# Patient Record
Sex: FEMALE | Race: WHITE | NOT HISPANIC OR LATINO | Employment: FULL TIME | URBAN - METROPOLITAN AREA
[De-identification: names, ages, dates, MRNs, and addresses within clinical notes are randomized per-mention and may not be internally consistent; named-entity substitution may affect disease eponyms.]

---

## 2017-04-22 ENCOUNTER — HOSPITAL ENCOUNTER (EMERGENCY)
Facility: HOSPITAL | Age: 52
Discharge: HOME | End: 2017-04-22
Attending: EMERGENCY MEDICINE | Admitting: EMERGENCY MEDICINE

## 2017-04-22 ENCOUNTER — APPOINTMENT (OUTPATIENT)
Dept: CARDIOLOGY | Facility: HOSPITAL | Age: 52
End: 2017-04-22

## 2017-04-22 VITALS
TEMPERATURE: 98.5 F | HEIGHT: 65 IN | DIASTOLIC BLOOD PRESSURE: 79 MMHG | BODY MASS INDEX: 22.33 KG/M2 | SYSTOLIC BLOOD PRESSURE: 140 MMHG | HEART RATE: 58 BPM | WEIGHT: 134 LBS | OXYGEN SATURATION: 99 % | RESPIRATION RATE: 16 BRPM

## 2017-04-22 DIAGNOSIS — R60.0 PEDAL EDEMA: Primary | ICD-10-CM

## 2017-04-22 PROCEDURE — 99283 EMERGENCY DEPT VISIT LOW MDM: CPT

## 2017-04-22 PROCEDURE — 93971 EXTREMITY STUDY: CPT

## 2017-04-22 PROCEDURE — 93971 EXTREMITY STUDY: CPT | Performed by: INTERNAL MEDICINE

## 2017-04-22 RX ORDER — TAMOXIFEN CITRATE 20 MG/1
20 TABLET ORAL
COMMUNITY
Start: 2017-03-31 | End: 2017-06-30

## 2017-04-22 NOTE — ED PROVIDER NOTES
Subjective   HPI Comments: Patient was seen at urgent treatment center sent here to rule out DVT.    Patient is a 51 y.o. female presenting with lower extremity pain.   History provided by:  Patient   used: No    Lower Extremity Issue   Location:  Leg  Time since incident:  2 days  Injury: no    Leg location:  L leg and L lower leg  Pain details:     Quality:  Dull    Radiates to:  Does not radiate    Severity:  Mild    Onset quality:  Gradual    Duration:  3 days    Timing:  Intermittent    Progression:  Waxing and waning  Chronicity:  New  Dislocation: no    Foreign body present:  No foreign bodies  Relieved by:  Nothing  Worsened by:  Exercise  Ineffective treatments:  None tried  Associated symptoms: no back pain, no decreased ROM, no fever and no neck pain        Review of Systems   Constitutional: Negative for chills and fever.   Respiratory: Negative for chest tightness, shortness of breath and wheezing.    Cardiovascular: Negative for chest pain, palpitations and leg swelling.   Gastrointestinal: Negative for abdominal pain, nausea and vomiting.   Genitourinary: Negative for dysuria, frequency, hematuria and pelvic pain.   Musculoskeletal: Negative for back pain and neck pain.   Neurological: Negative for dizziness and weakness.   Psychiatric/Behavioral: Negative.    All other systems reviewed and are negative.      Past Medical History:   Diagnosis Date   • Breast cancer        No Known Allergies    Past Surgical History:   Procedure Laterality Date   • MASTECTOMY Bilateral        History reviewed. No pertinent family history.    Social History     Social History   • Marital status:      Spouse name: N/A   • Number of children: N/A   • Years of education: N/A     Social History Main Topics   • Smoking status: Never Smoker   • Smokeless tobacco: None   • Alcohol use No   • Drug use: No   • Sexual activity: Not Asked     Other Topics Concern   • None     Social History Narrative    • None           Objective   Physical Exam   Constitutional: She is oriented to person, place, and time. She appears well-developed and well-nourished.   HENT:   Head: Normocephalic and atraumatic.   Right Ear: External ear normal.   Left Ear: External ear normal.   Nose: Nose normal.   Mouth/Throat: Oropharynx is clear and moist.   Eyes: Conjunctivae and EOM are normal. Pupils are equal, round, and reactive to light. No scleral icterus.   Neck: Normal range of motion. No thyromegaly present.   Cardiovascular: Normal rate, regular rhythm and normal heart sounds.    Pulmonary/Chest: Effort normal and breath sounds normal. No respiratory distress. She has no wheezes. She has no rales. She exhibits no tenderness.   Abdominal: Soft. Bowel sounds are normal. She exhibits no distension. There is no tenderness.   Musculoskeletal: Normal range of motion.   Mild edema lower extremity.  Mild discomfort on the left malleolus.  Otherwise unremarkable   Lymphadenopathy:     She has no cervical adenopathy.   Neurological: She is alert and oriented to person, place, and time. She has normal reflexes. She displays normal reflexes. No cranial nerve deficit. Coordination normal.   Skin: Skin is warm and dry.   Psychiatric: She has a normal mood and affect. Her behavior is normal. Judgment and thought content normal.   Nursing note and vitals reviewed.      Procedures         ED Course  ED Course        No results found for this or any previous visit (from the past 24 hour(s)).  Note: In addition to lab results from this visit, the labs listed above may include labs taken at another facility or during a different encounter within the last 24 hours. Please correlate lab times with ED admission and discharge times for further clarification of the services performed during this visit.    No orders to display     Vitals:    04/22/17 1607 04/22/17 1633 04/22/17 1634 04/22/17 1933   BP: 153/74   140/79   Pulse: 82   58   Resp: 16   16  "  Temp:   98.5 °F (36.9 °C)    SpO2: 98%   99%   Weight:  134 lb (60.8 kg)     Height:  65\" (165.1 cm)       Medications - No data to display  ECG/EMG Results (last 24 hours)     ** No results found for the last 24 hours. **        No results found for this or any previous visit (from the past 24 hour(s)).  Note: In addition to lab results from this visit, the labs listed above may include labs taken at another facility or during a different encounter within the last 24 hours. Please correlate lab times with ED admission and discharge times for further clarification of the services performed during this visit.    No orders to display     Vitals:    04/22/17 1607 04/22/17 1633 04/22/17 1634 04/22/17 1933   BP: 153/74   140/79   Pulse: 82   58   Resp: 16   16   Temp:   98.5 °F (36.9 °C)    SpO2: 98%   99%   Weight:  134 lb (60.8 kg)     Height:  65\" (165.1 cm)       Medications - No data to display  ECG/EMG Results (last 24 hours)     ** No results found for the last 24 hours. **                  MDM  Number of Diagnoses or Management Options  Pedal edema: new and requires workup  Diagnosis management comments: Duplex Doppler was negative. Discussed need to follow up with PMD for further work up.        Amount and/or Complexity of Data Reviewed  Tests in the radiology section of CPT®: reviewed and ordered  Discuss the patient with other providers: yes    Patient Progress  Patient progress: stable      Final diagnoses:   Pedal edema            ANDRADE Green  04/26/17 2238       ANDRADE Green  04/29/17 0737       ANDRADE Green  04/29/17 0737       ANDRADE Green  04/30/17 1816    "

## 2017-04-25 LAB
BH CV LOWER VASCULAR LEFT COMMON FEMORAL AUGMENT: NORMAL
BH CV LOWER VASCULAR LEFT COMMON FEMORAL COMPRESS: NORMAL
BH CV LOWER VASCULAR LEFT COMMON FEMORAL PHASIC: NORMAL
BH CV LOWER VASCULAR LEFT COMMON FEMORAL SPONT: NORMAL
BH CV LOWER VASCULAR LEFT DISTAL FEMORAL COMPRESS: NORMAL
BH CV LOWER VASCULAR LEFT GASTRONEMIUS COMPRESS: NORMAL
BH CV LOWER VASCULAR LEFT GREATER SAPH AK COMPRESS: NORMAL
BH CV LOWER VASCULAR LEFT MID FEMORAL AUGMENT: NORMAL
BH CV LOWER VASCULAR LEFT MID FEMORAL COMPRESS: NORMAL
BH CV LOWER VASCULAR LEFT MID FEMORAL PHASIC: NORMAL
BH CV LOWER VASCULAR LEFT MID FEMORAL SPONT: NORMAL
BH CV LOWER VASCULAR LEFT PERONEAL COMPRESS: NORMAL
BH CV LOWER VASCULAR LEFT POPLITEAL AUGMENT: NORMAL
BH CV LOWER VASCULAR LEFT POPLITEAL COMPRESS: NORMAL
BH CV LOWER VASCULAR LEFT POPLITEAL PHASIC: NORMAL
BH CV LOWER VASCULAR LEFT POPLITEAL SPONT: NORMAL
BH CV LOWER VASCULAR LEFT POSTERIOR TIBIAL COMPRESS: NORMAL
BH CV LOWER VASCULAR LEFT PROXIMAL FEMORAL COMPRESS: NORMAL
BH CV LOWER VASCULAR LEFT SAPHENOFEMORAL JUNCTION AUGMENT: NORMAL
BH CV LOWER VASCULAR LEFT SAPHENOFEMORAL JUNCTION COMPRESS: NORMAL
BH CV LOWER VASCULAR LEFT SAPHENOFEMORAL JUNCTION PHASIC: NORMAL
BH CV LOWER VASCULAR LEFT SAPHENOFEMORAL JUNCTION SPONT: NORMAL
BH CV LOWER VASCULAR RIGHT COMMON FEMORAL AUGMENT: NORMAL
BH CV LOWER VASCULAR RIGHT COMMON FEMORAL COMPRESS: NORMAL
BH CV LOWER VASCULAR RIGHT COMMON FEMORAL PHASIC: NORMAL
BH CV LOWER VASCULAR RIGHT COMMON FEMORAL SPONT: NORMAL

## 2018-07-23 ENCOUNTER — TRANSCRIBE ORDERS (OUTPATIENT)
Dept: ADMINISTRATIVE | Facility: HOSPITAL | Age: 53
End: 2018-07-23

## 2018-07-23 DIAGNOSIS — M79.622 AXILLARY PAIN, LEFT: Primary | ICD-10-CM

## 2018-07-26 ENCOUNTER — HOSPITAL ENCOUNTER (OUTPATIENT)
Dept: MAMMOGRAPHY | Facility: HOSPITAL | Age: 53
Discharge: HOME | End: 2018-07-26

## 2018-07-26 ENCOUNTER — HOSPITAL ENCOUNTER (OUTPATIENT)
Dept: ULTRASOUND IMAGING | Facility: HOSPITAL | Age: 53
Discharge: HOME | End: 2018-07-26
Attending: OBSTETRICS & GYNECOLOGY | Admitting: OBSTETRICS & GYNECOLOGY

## 2018-07-26 DIAGNOSIS — M79.622 AXILLARY PAIN, LEFT: ICD-10-CM

## 2018-07-26 PROCEDURE — 77066 DX MAMMO INCL CAD BI: CPT

## 2018-07-26 PROCEDURE — 76642 ULTRASOUND BREAST LIMITED: CPT

## 2018-07-26 PROCEDURE — 77066 DX MAMMO INCL CAD BI: CPT | Performed by: RADIOLOGY

## 2018-07-26 PROCEDURE — G0279 TOMOSYNTHESIS, MAMMO: HCPCS

## 2018-07-26 PROCEDURE — 77062 BREAST TOMOSYNTHESIS BI: CPT | Performed by: RADIOLOGY

## 2018-07-26 PROCEDURE — 76642 ULTRASOUND BREAST LIMITED: CPT | Performed by: RADIOLOGY

## 2018-10-11 ENCOUNTER — HOSPITAL ENCOUNTER (OUTPATIENT)
Dept: PHYSICAL THERAPY | Facility: HOSPITAL | Age: 53
Setting detail: THERAPIES SERIES
Discharge: HOME | End: 2018-10-11

## 2018-10-11 DIAGNOSIS — N64.4 MASTODYNIA OF LEFT BREAST: Primary | ICD-10-CM

## 2018-10-11 DIAGNOSIS — L90.5 SCAR CONDITIONS AND FIBROSIS OF SKIN: ICD-10-CM

## 2018-10-11 PROCEDURE — 97140 MANUAL THERAPY 1/> REGIONS: CPT | Performed by: PHYSICAL THERAPIST

## 2018-10-11 PROCEDURE — 97161 PT EVAL LOW COMPLEX 20 MIN: CPT | Performed by: PHYSICAL THERAPIST

## 2018-10-11 PROCEDURE — 97535 SELF CARE MNGMENT TRAINING: CPT | Performed by: PHYSICAL THERAPIST

## 2018-10-11 NOTE — THERAPY EVALUATION
Physical Therapy Orthopedic / Lymphedema Initial Evaluation  [facility]     Patient Name: Valery Porter  : 1965  MRN: [id number]  Today's Date: 10/11/2018      Visit Date: 10/11/2018    Visit Dx:    ICD-10-CM ICD-9-CM   1. Mastodynia of left breast N64.4 611.71   2. Scar conditions and fibrosis of skin L90.5 709.2       There is no problem list on file for this patient.       Past Medical History:   Diagnosis Date   • Breast cancer (CMS/HCC)         Past Surgical History:   Procedure Laterality Date   • AUGMENTATION MAMMAPLASTY     • BREAST BIOPSY     • MASTECTOMY Bilateral        Visit Dx:    ICD-10-CM ICD-9-CM   1. Mastodynia of left breast N64.4 611.71   2. Scar conditions and fibrosis of skin L90.5 709.2             Patient History     Row Name 10/11/18 1300          Chief Complaint Pain  -MW    Type of Pain Chest pain;Rib pain   Left adjacent to reconstruction   -MW    Date Current Problem(s) Began 18  -MW    Brief Description of Current Complaint Pt reports noticing intermittent pain left ribs, near the breast starting in  of this year. She reports the pain is intermittent and does not seem to have a pattern to what triggers it. She denies concerns with flexibility or strength; also denies numbness or tingling. No issues with coughing, sneezing or deep breath. Referred to PT for assistance as diagnosistic testing is negative for any cancer recurrence or cyst.   -MW    Previous treatment for THIS PROBLEM --   None  -MW    Patient/Caregiver Goals Relieve pain;Know what to do to help the symptoms  -MW    Patient's Rating of General Health Excellent  -MW    Hand Dominance right-handed  -MW    Occupation/sports/leisure activities Works full time as  for a school system. Plays piano, walks and cares for her 3 children (twins in 8th grade and sophomore).   -MW    Patient seeing anyone else for problem(s)? Seen by Dr Casillas and Dr Shruti Alan.   -MW    What clinical tests have  you had for this problem? Mammogram;Other 1 (comment)   US  -MW    Results of Clinical Tests Negative for dz processes or cysts   -MW    Are you or can you be pregnant No  -MW          Pain Location Chest;Breast;Rib cage   Left   -MW    Pain at Present 0  -MW    Pain at Best 0  -MW    Pain at Worst 4  -MW    Pain Frequency Intermittent;Several days a week  -MW    Pain Description Shooting;Stabbing;Tender  -MW    What Performance Factors Make the Current Problem(s) WORSE? unknown   -MW    What Performance Factors Make the Current Problem(s) BETTER? unknown  -MW    Pain Comments No pattern to pain   -MW    Is your sleep disturbed? No  -MW          Any falls in the past year: No  -MW          Primary Language English  -MW    Are you able to read Yes  -MW    Are you able to write Yes  -MW    How does patient learn best? Demonstration;Listening  -MW    Teaching needs identified Home Exercise Program;Management of Condition  -MW    Patient is concerned about/has problems with Other (comment)   pain   -MW    Does patient have problems with the following? None  -MW    Barriers to learning None  -MW    Pt Participated in POC and Goals Yes  -MW          Are you being hurt, hit, or frightened by anyone at home or in your life? No  -MW    Are you being neglected by a caregiver No  -MW      User Key  (r) = Recorded By, (t) = Taken By, (c) = Cosigned By    Initials Name Provider Type    Katie Tim, PT Physical Therapist                Lymphedema     Row Name 10/11/18 1300          Lymphedema Cancer Related Sx bilateral;modified radical mastectomy;reconstructive;left;sentinel node biopsy  -MW    Lymphedema Surgery Comments MD Nicole; 2015  -MW    Lymph Nodes Removed # 1   pt unsure if more than 1  -MW    Positive Lymph Nodes # 0  -MW    Chemo Received --   tamoxafin   -MW    Radiation Therapy Received no  -MW    Infections or Cellulitis? no  -MW    Lymphedema Assessment Comments stage 0 / minimal risk   -MW           Location/Assessment Upper Quadrant  -MW    Upper Quadrant Conditions bilateral:;intact;clean  -MW    Upper Quadrant Color/Pigment bilateral:;normal  -MW    Upper Quadrant Skin Details Bilat mastectomy incision lines, as well as reconstruction. Lt inferior lateral incision line puckered and restricted on Lt, smooth on Rt   -MW          Lymphedema Pulses/Capillary Refill capillary refill  -MW    Capillary Refill upper extremity capillary refill  -MW    Upper Extremity Capillary Refill left:;less than 3 seconds  -MW          Manual Lymphatic Drainage astym  -MW    Astym anterior-lateral chest;scar(s) / incision line(s)  -MW    Scar(s) / Incision Line(s) Lt inferior lateral incision line starburst with isolator; moderate course and fine soft tissue disruptions noted.   -MW    Anterior-Lateral Chest left  -MW    Anterior-Lateral Chest Comment In supine evaluator and localizer to lateral ribs and inferior ribs. Min to moderate course to fine soft tissue disruptions noted. Repositioned with HBH and open axilla; continued ASTYM into upper arm and superior lateral chest with evaluator and localizer. Reworking lateral ribs in this position. Mild course soft tissue disruptions noted in area of pain (but not tender during tx).   -MW    Manual Therapy STM: including tissue bending adjacent to incision line and lateral breast, into axilla   -MW      User Key  (r) = Recorded By, (t) = Taken By, (c) = Cosigned By    Initials Name Provider Type    Katie Tim PT Physical Therapist                  PT Ortho     Row Name 10/11/18 1300       Subjective Comments    Subjective Comments Doctors couldn't find anything on tests; thought PT might be helpful as I haven't ever had any since mastectomies and reconstructions.   -MW       Subjective Pain    Able to rate subjective pain? yes  -MW    Pre-Treatment Pain Level 0  -MW    Post-Treatment Pain Level 0  -MW       General ROM    GENERAL ROM COMMENTS Shoulder ROM WFL to WNL  bilaterally   -MW       MMT (Manual Muscle Testing)    General MMT Comments Pt denies concerns about strength   -MW      User Key  (r) = Recorded By, (t) = Taken By, (c) = Cosigned By    Initials Name Provider Type    Katie Tim PT Physical Therapist                    Therapy Education  Education Details: HEP: HBH chicken wings with rotation and sidebending with deep breath; ASTYM care and follow up.  Given: HEP, Symptoms/condition management, Pain management  Program: New  How Provided: Verbal, Demonstration, Written  Provided to: Patient  Level of Understanding: Teach back education performed, Verbalized, Demonstrated  26346 - PT Self Care/Mgmt Minutes: 8            Exercises     Row Name 10/11/18 1300             Subjective Comments    Subjective Comments Doctors couldn't find anything on tests; thought PT might be helpful as I haven't ever had any since mastectomies and reconstructions.   -MW         Subjective Pain    Able to rate subjective pain? yes  -MW      Pre-Treatment Pain Level 0  -MW      Post-Treatment Pain Level 0  -MW      Subjective Pain Comment Pain is intermittent; very mild tenderness to palpation   -MW         Total Minutes    70692 - PT Manual Therapy Minutes 20  -MW         Exercise 1    Exercise Name 1 HBH chicken wings with trunk rotation   -MW      Cueing 1 Demo;Verbal  -MW      Reps 1 4; 2 each way   -MW      Additional Comments VC for deep breath at end of rotation   -MW         Exercise 2    Exercise Name 2 HBH chicken wings with trunk sidebending   -MW      Cueing 2 Demo;Verbal  -MW      Reps 2 4; 2 each way   -MW      Additional Comments VC for deep breath at end of rotation   -MW        User Key  (r) = Recorded By, (t) = Taken By, (c) = Cosigned By    Initials Name Provider Type    Katie Tim PT Physical Therapist                              PT OP Goals     Row Name 10/11/18 1300          STG Date to Achieve 11/08/18  -MW    STG 1 Pt independent with HEP for  flexibility to decrease lateral ribs/ breast pain.   -MW    STG 2 Pt to report 25% decrease in frequency and / or intensity of Lt lateral ribs/ breast pain.   -MW    STG 3 Lt lateral inferior incision line soft tissue to be at least 25% more mobile to promote decreased pain.   -MW          LTG 1 Pt to be independent with HEP and self massage to decrease pain and tightness Lt lateral ribs/ breast.   -MW    LTG 2 Pt to report > 50% decrease in frequency and / or intensity of Lt lateral ribs/ breast pain.   -MW    LTG 3 Lt lateral inferior incision line soft tissue to be greater than 50% more mobile to promote decreased pain.   -MW          PT Goal Re-Cert Due Date 01/11/19  -MW      User Key  (r) = Recorded By, (t) = Taken By, (c) = Cosigned By    Initials Name Provider Type    Katie Tim, PT Physical Therapist                PT Assessment/Plan     Row Name 10/11/18 1300          Functional Limitations Other (comment)   Intermittent pain; soft tissue restrictions   -MW    Impairments Pain;Impaired flexibility  -MW    Assessment Comments Pt presents with c/o pain left lateral trunk / ribs adjacent to breast reconstruction area. Unknown trigger to onset of pain, and difficult to reproduce. Mild tenderness over mid lateral ribs, but noted moderate soft tissue restrictions at Lt inferior-lateral incision line including tissue puckering which is absent on the pain-free Rt side. Expect ASTYM and STM to assist in decreasing the soft tissue restrictions and disruptions as well as decrease her pain symptoms. Pt demonstrates adequate ROM and denies weakness or numbness/ tingling.   -MW    Please refer to paper survey for additional self-reported information Yes  -MW    Rehab Potential Good  -MW    Patient/caregiver participated in establishment of treatment plan and goals Yes  -MW    Patient would benefit from skilled therapy intervention Yes  -MW          PT Frequency Other (comment)   2 x month   -MW    Predicted  Duration of Therapy Intervention (Therapy Eval) 6 visits  -MW    Planned CPT's? PT EVAL LOW COMPLEXITY: 97958;PT MANUAL THERAPY EA 15 MIN: 84241;PT THER PROC EA 15 MIN: 63381  -MW    Physical Therapy Interventions (Optional Details) manual therapy techniques;patient/family education;home exercise program;stretching;strengthening;ROM (Range of Motion)  -MW    PT Plan Comments Cont ASTYM / STM to Lt upper quarter; progress HEP; monitor pain frequency and intensity   -MW      User Key  (r) = Recorded By, (t) = Taken By, (c) = Cosigned By    Initials Name Provider Type    MW Katie Nielsen, PT Physical Therapist             Outcome Measure Options: Disabilities of the Arm, Shoulder, and Hand (DASH)  DASH  Open a tight or new jar.: No Difficulty  Write: No Difficulty  Turn a key: No Difficulty  Prepare a meal: No Difficulty  Push open a heavy door: No Difficulty  Place an object on a shelf above your head: No Difficulty  Do heavy household chores (e.g., wash walls, wash floors): No Difficulty  Garden or do yard work: No Difficulty  Make a bed: No Difficulty  Carry a shopping bag or briefcase: No Difficulty  Carry a heavy object (over 10 lbs): No Difficulty  Change a lightbulb overhead: No Difficulty  Wash or blow dry your hair: No Difficulty  Wash your back: No Difficulty  Put on a pullover sweater: No Difficulty  Use a knife to cut food: No Difficulty  Recreational activities in which require little effort (e.g., cardplaying, knitting, etc.): No Difficulty  Recreational activities in which you take some force or impact through your arm, should or hand (e.g. golf, hammering, tennis, etc.): No Difficulty  Recreational Activities in which you move your arm freely (e.g., frisbee, badminton, etc.): No Difficulty  Manage transportation needs (getting from one place to another): No Difficulty  Sexual Activities: No Difficulty  During the past week, to what extent has your arm, shoulder, or hand problem interfered with your  normal social activites with family, friends, neighbors or groups?: Not at all  During the past week, were you limited in your work or other regular daily activities as a result of your arm, shoulder or hand problem?: Not limited at all  Arm, Shoulder, or hand pain: None  Arm, shoulder or hand pain when you performed any specific activity: None  Tingling (pins and needles) in your arm, shoulder, or hand: None  Weakness in your arm, shoulder or hand: None  Stiffness in your arm, shoulder or hand: None  During the past week, how much difficulty have you had sleeping because of the pain in your arm, shoulder or hand?: No difficulty  I feel less capable, less confident or less useful because of my arm, shoulder or hand problem: Strongly disagree  DASH Sum : 30  Number of Questions Answered: 30  DASH Score: 0  Work Module (Optional)  Using your usual technique for your work?: No Difficulty  Doing your usual work because of arm, shoulder or hand pain?: No Difficulty  Doing your work as well as you would like?: No Difficulty  Spending your usual amount of time doing your work?: No Difficulty  Work Module Score: 0  Sports/Performing Arts Module (Optional)  Using your usual technique for playing your instrument or sport?: No Difficulty  Playing your musical instrument or sport because of arm, shoulder or hand pain?: No Difficulty  Playing your musical instrument or sport as well as you would like?: No Difficulty  Spending your usual amount of time practising or playing your instrument or sport?: No Difficulty  Sports/Performing Arts Score: 0         Time Calculation:   Start Time: 1300  Total Timed Code Minutes- PT: 28 minute(s)   Therapy Suggested Charges     Code   Minutes Charges    10215 (CPT®) Hc Pt Neuromusc Re Education Ea 15 Min      20473 (CPT®) Hc Pt Ther Proc Ea 15 Min      25192 (CPT®) Hc Gait Training Ea 15 Min      22427 (CPT®) Hc Pt Therapeutic Act Ea 15 Min      79998 (CPT®) Hc Pt Manual Therapy Ea 15 Min 20  1    02468 (CPT®) Hc Pt Ther Massage- Per 15 Min      17812 (CPT®) Hc Pt Iontophoresis Ea 15 Min      71057 (CPT®) Hc Pt Elec Stim Ea-Per 15 Min      36768 (CPT®) Hc Pt Ultrasound Ea 15 Min      24151 (CPT®) Hc Pt Self Care/Mgmt/Train Ea 15 Min 8 1    11945 (CPT®) Hc Pt Prosthetic (S) Train Initial Encounter, Each 15 Min      18316 (CPT®) Hc Orthotic(S) Mgmt/Train Initial Encounter, Each 15min      89527 (CPT®) Hc Pt Aquatic Therapy Ea 15 Min      62729 (CPT®) Hc Pt Orthotic(S)/Prosthetic(S) Encounter, Each 15 Min       (CPT®) Hc Pt Electrical Stim Unattended      Total  28 2        Therapy Charges for Today     Code Description Service Date Service Provider Modifiers Qty    51252614588 HC PT MANUAL THERAPY EA 15 MIN 10/11/2018 Katie Nielsen, PT GP 1    00994649261 HC PT SELF CARE/MGMT/TRAIN EA 15 MIN 10/11/2018 Katie Nielsen, PT GP 1    11434265647 HC PT EVAL LOW COMPLEXITY 3 10/11/2018 Katie Nielsen, PT GP 1          PT G-Codes  Outcome Measure Options: Disabilities of the Arm, Shoulder, and Hand (DASH)         Katie Nielsen, PT  10/11/2018

## 2018-10-12 ENCOUNTER — TRANSCRIBE ORDERS (OUTPATIENT)
Dept: PHYSICAL THERAPY | Facility: HOSPITAL | Age: 53
End: 2018-10-12

## 2018-10-12 DIAGNOSIS — N64.4 BREAST PAIN: Primary | ICD-10-CM

## 2018-11-15 ENCOUNTER — HOSPITAL ENCOUNTER (OUTPATIENT)
Dept: PHYSICAL THERAPY | Facility: HOSPITAL | Age: 53
Setting detail: THERAPIES SERIES
Discharge: HOME | End: 2018-11-15
Attending: SURGERY

## 2018-11-15 DIAGNOSIS — N64.4 MASTODYNIA OF LEFT BREAST: Primary | ICD-10-CM

## 2018-11-15 DIAGNOSIS — L90.5 SCAR CONDITIONS AND FIBROSIS OF SKIN: ICD-10-CM

## 2018-11-15 PROCEDURE — 97140 MANUAL THERAPY 1/> REGIONS: CPT | Performed by: PHYSICAL THERAPIST

## 2018-11-15 PROCEDURE — 97110 THERAPEUTIC EXERCISES: CPT | Performed by: PHYSICAL THERAPIST

## 2018-11-15 NOTE — THERAPY PROGRESS REPORT/RE-CERT
Outpatient Physical Therapy Ortho/ Lymphedema Progress Note  [facility]     Patient Name: Valery Porter  : 1965  MRN: [id number]  Today's Date: 11/15/2018        Visit Date: 11/15/2018    Visit Dx:    ICD-10-CM ICD-9-CM   1. Mastodynia of left breast N64.4 611.71   2. Scar conditions and fibrosis of skin L90.5 709.2       There is no problem list on file for this patient.       Lymphedema     Row Name 11/15/18 1300          Able to rate subjective pain?  yes  -MW    Pre-Treatment Pain Level  0  -MW    Post-Treatment Pain Level  0  -MW          Subjective Comments  Intermittent pain less frequent; doing exercises most days.   -MW          Location/Assessment  Upper Quadrant  -MW    Upper Quadrant Conditions  bilateral:;intact;clean  -MW    Upper Quadrant Color/Pigment  bilateral:;normal  -MW          Manual Lymphatic Drainage  astym  -MW    Astym  anterior-lateral chest;scar(s) / incision line(s);other astym  -MW    Scar(s) / Incision Line(s)  Lt inferior lateral incision line starburst with isolator; moderate course and fine soft tissue disruptions noted.   -MW    Anterior-Lateral Chest  left  -MW    Other Astym  ASTYM initiated in sitting with upper body resting on table top face craddle. Evaluator and localizer to Lt lateral trunk; isolator to lower ribs / adjacent to incision line. In supine evaluator and localizer to lateral and inferior ribs. Min to moderate course to fine soft tissue disruptions noted. Reworking lateral ribs with UE over head. Mild to moderate fine to course disruptions noted in lateral trunk / over area of pain but only mildly tender to palpation.   -MW      User Key  (r) = Recorded By, (t) = Taken By, (c) = Cosigned By    Initials Name Provider Type    Katie Tim, PT Physical Therapist                        PT Assessment/Plan     Row Name 11/15/18 1300          PT Assessment    Functional Limitations  Other (comment) Intermittent pain; soft tissue restrictions   -MW      Impairments  Pain;Impaired flexibility  -MW     Assessment Comments  Pt returns with decreased complaints of pain; less frequent and less intense. Soft tissue disruptions persist with roughness noted during ASTYM, as well as soft tissue mobility restrictions noted during STM. Progressed HEP to continue to focus on loosening soft tissues along Lt lateral trunk / ribs. Cont PT 2 x month; follow up in approx 2 weeks for 1-2 more sessions of manual therapy.   -MW     Rehab Potential  Good  -MW     Patient/caregiver participated in establishment of treatment plan and goals  Yes  -MW     Patient would benefit from skilled therapy intervention  Yes  -MW        PT Plan    PT Frequency  Other (comment) 2 x month   -MW     Predicted Duration of Therapy Intervention (Therapy Eval)  4 visits   -MW     Planned CPT's?  PT MANUAL THERAPY EA 15 MIN: 38034;PT THER PROC EA 15 MIN: 73687  -MW     Physical Therapy Interventions (Optional Details)  manual therapy techniques;stretching;ROM (Range of Motion);patient/family education;home exercise program  -MW     PT Plan Comments  Cont ASTYM / STM to Lt upper quarter; progress HEP; monitor pain frequency and intensity   -MW       User Key  (r) = Recorded By, (t) = Taken By, (c) = Cosigned By    Initials Name Provider Type    Katie Tim, PT Physical Therapist               Exercises     Row Name 11/15/18 1300             Subjective Comments    Subjective Comments  Intermittent pain less frequent; doing exercises most days.   -MW         Subjective Pain    Able to rate subjective pain?  yes  -MW      Pre-Treatment Pain Level  0  -MW      Post-Treatment Pain Level  0  -MW         Total Minutes    35266 - PT Therapeutic Exercise Minutes  8  -MW      20309 - PT Manual Therapy Minutes  20  -MW         Exercise 1    Exercise Name 1  RT SL with upper trunk rotation to LT   -MW      Cueing 1  Verbal;Tactile  -MW      Reps 1  2  -MW      Additional Comments  VC for deep breath at end  "of rotation   -MW         Exercise 2    Exercise Name 2  Supine side bending; \"banana stretch\"   -MW      Cueing 2  Demo;Verbal  -MW      Reps 2  2  -MW      Additional Comments  VC for deep breath at end of stretch   -MW        User Key  (r) = Recorded By, (t) = Taken By, (c) = Cosigned By    Initials Name Provider Type    MW Katie Nielsen, PT Physical Therapist                       Manual Rx (last 36 hours)      Manual Treatments     Row Name 11/15/18 1300             Total Minutes    43651 - PT Manual Therapy Minutes  20  -MW         Manual Rx 1    Manual Rx 1 Location  Lt lateral trunk/ ribs/ inferior and lateral to breast reconstruction   -MW      Manual Rx 1 Type  STM: tissue lifting, bending and space correction techniques; fascial stretches   -MW      Manual Rx 1 Duration  10  -MW        User Key  (r) = Recorded By, (t) = Taken By, (c) = Cosigned By    Initials Name Provider Type    MW Katie Nielsen, PT Physical Therapist          PT OP Goals     Row Name 11/15/18 1300          STG 1  Pt independent with HEP for flexibility to decrease lateral ribs/ breast pain.   -MW    STG 1 Progress  Met  -MW    STG 2  Pt to report 25% decrease in frequency and / or intensity of Lt lateral ribs/ breast pain.   -MW    STG 2 Progress  Met  -MW    STG 3  Lt lateral inferior incision line soft tissue to be at least 25% more mobile to promote decreased pain.   -MW    STG 3 Progress  Partially Met;Ongoing  -MW          LTG 1  Pt to be independent with HEP and self massage to decrease pain and tightness Lt lateral ribs/ breast.   -MW    LTG 1 Progress  Ongoing  -MW    LTG 2  Pt to report > 50% decrease in frequency and / or intensity of Lt lateral ribs/ breast pain.   -MW    LTG 2 Progress  Ongoing  -MW    LTG 3  Lt lateral inferior incision line soft tissue to be greater than 50% more mobile to promote decreased pain.   -MW    LTG 3 Progress  Ongoing  -MW          PT Goal Re-Cert Due Date  01/11/19  -MW      User Key  " (r) = Recorded By, (t) = Taken By, (c) = Cosigned By    Initials Name Provider Type    MW Katie Nielsen, PT Physical Therapist          Therapy Education  Education Details: HEP added banana stretch, RT SL with upper trunk rotation Lt.   Given: HEP, Symptoms/condition management, Pain management  Program: Progressed  How Provided: Verbal, Demonstration, Written  Provided to: Patient  Level of Understanding: Teach back education performed, Verbalized, Demonstrated              Time Calculation:   Start Time: 1300  Total Timed Code Minutes- PT: 28 minute(s)   Therapy Suggested Charges     Code   Minutes Charges    98640 (CPT®) Hc Pt Neuromusc Re Education Ea 15 Min      81718 (CPT®) Hc Pt Ther Proc Ea 15 Min 8 1    81886 (CPT®) Hc Gait Training Ea 15 Min      27337 (CPT®) Hc Pt Therapeutic Act Ea 15 Min      37987 (CPT®) Hc Pt Manual Therapy Ea 15 Min 20 1    19465 (CPT®) Hc Pt Ther Massage- Per 15 Min      38341 (CPT®) Hc Pt Iontophoresis Ea 15 Min      40678 (CPT®) Hc Pt Elec Stim Ea-Per 15 Min      23835 (CPT®) Hc Pt Ultrasound Ea 15 Min      13043 (CPT®) Hc Pt Self Care/Mgmt/Train Ea 15 Min      03611 (CPT®) Hc Pt Prosthetic (S) Train Initial Encounter, Each 15 Min      33093 (CPT®) Hc Orthotic(S) Mgmt/Train Initial Encounter, Each 15min      52111 (CPT®) Hc Pt Aquatic Therapy Ea 15 Min      05396 (CPT®) Hc Pt Orthotic(S)/Prosthetic(S) Encounter, Each 15 Min       (CPT®) Hc Pt Electrical Stim Unattended      Total  28 2        Therapy Charges for Today     Code Description Service Date Service Provider Modifiers Qty    49832056606 HC PT THER PROC EA 15 MIN 11/15/2018 Katie Nielsen, PT GP 1    82732049626 HC PT MANUAL THERAPY EA 15 MIN 11/15/2018 Katie Nielsen, PT GP 1                    Katie Nielsen, PT  11/15/2018

## 2019-01-24 ENCOUNTER — DOCUMENTATION (OUTPATIENT)
Dept: PHYSICAL THERAPY | Facility: HOSPITAL | Age: 54
End: 2019-01-24

## 2019-01-24 DIAGNOSIS — L90.5 SCAR CONDITIONS AND FIBROSIS OF SKIN: ICD-10-CM

## 2019-01-24 DIAGNOSIS — N64.4 MASTODYNIA OF LEFT BREAST: Primary | ICD-10-CM

## 2019-01-24 NOTE — THERAPY DISCHARGE NOTE
Outpatient Physical Therapy Lymphedema Discharge Summary       Patient Name: Valery Porter  : 1965  MRN: [id number]  Today's Date: 2019      Visit Date: 2019    Visit Dx:    ICD-10-CM ICD-9-CM   1. Mastodynia of left breast N64.4 611.71   2. Scar conditions and fibrosis of skin L90.5 709.2           PT OP Goals     Row Name 19 1430          STG 1  Pt independent with HEP for flexibility to decrease lateral ribs/ breast pain.   -MW    STG 1 Progress  Met  -MW    STG 2  Pt to report 25% decrease in frequency and / or intensity of Lt lateral ribs/ breast pain.   -MW    STG 2 Progress  Met  -MW    STG 3  Lt lateral inferior incision line soft tissue to be at least 25% more mobile to promote decreased pain.   -MW    STG 3 Progress  Partially Met;Ongoing  -MW          LTG 1  Pt to be independent with HEP and self massage to decrease pain and tightness Lt lateral ribs/ breast.   -MW    LTG 1 Progress  Partially Met  -MW    LTG 2  Pt to report > 50% decrease in frequency and / or intensity of Lt lateral ribs/ breast pain.   -MW    LTG 2 Progress  Partially Met  -MW    LTG 3  Lt lateral inferior incision line soft tissue to be greater than 50% more mobile to promote decreased pain.   -MW    LTG 3 Progress  Partially Met  -MW      User Key  (r) = Recorded By, (t) = Taken By, (c) = Cosigned By    Initials Name Provider Type    Katie Tim, PT Physical Therapist                        OP PT Discharge Summary  Date of Discharge: 19  Reason for Discharge: other (comment)(Difficult to schedule due to travel distance & work schedule )  Outcomes Achieved: Patient able to partially acheive established goals  Discharge Destination: Home with home program  Discharge Instructions/Additional Comments: Due to distance and pt's schedule, she feels she would need to take a day off from work to come to PT; pain has improved at this time. Pt agreeable to discharge at this time as has not been seen  since Nov 15, 2018 and unsure of next appt option. She will contact MD for new referral if needed in the future to be scheduled again.       Katie Nielsen, PT  1/24/2019

## 2019-09-05 ENCOUNTER — LAB REQUISITION (OUTPATIENT)
Dept: LAB | Facility: HOSPITAL | Age: 54
End: 2019-09-05

## 2019-09-05 DIAGNOSIS — N95.0 POSTMENOPAUSAL BLEEDING: ICD-10-CM

## 2019-09-05 PROCEDURE — 88305 TISSUE EXAM BY PATHOLOGIST: CPT | Performed by: OBSTETRICS & GYNECOLOGY

## 2019-09-06 LAB
CYTO UR: NORMAL
LAB AP CASE REPORT: NORMAL
LAB AP CLINICAL INFORMATION: NORMAL
PATH REPORT.FINAL DX SPEC: NORMAL
PATH REPORT.GROSS SPEC: NORMAL

## 2021-08-18 ENCOUNTER — OFFICE VISIT (OUTPATIENT)
Dept: OBSTETRICS AND GYNECOLOGY | Facility: CLINIC | Age: 56
End: 2021-08-18

## 2021-08-18 VITALS
BODY MASS INDEX: 22.82 KG/M2 | WEIGHT: 137 LBS | HEIGHT: 65 IN | DIASTOLIC BLOOD PRESSURE: 70 MMHG | SYSTOLIC BLOOD PRESSURE: 102 MMHG

## 2021-08-18 DIAGNOSIS — Z01.419 ENCOUNTER FOR ANNUAL ROUTINE GYNECOLOGICAL EXAMINATION: Primary | ICD-10-CM

## 2021-08-18 DIAGNOSIS — Z85.3 PERSONAL HISTORY OF BREAST CANCER: ICD-10-CM

## 2021-08-18 PROCEDURE — 99396 PREV VISIT EST AGE 40-64: CPT | Performed by: OBSTETRICS & GYNECOLOGY

## 2021-08-18 RX ORDER — ATORVASTATIN CALCIUM 40 MG/1
40 TABLET, FILM COATED ORAL DAILY
COMMUNITY
Start: 2021-08-10

## 2021-08-18 NOTE — PROGRESS NOTES
GYN Annual Exam     CC - Here for annual exam.     Subjective   HPI  Valery Porter is a 56 y.o. female, , who presents for annual well woman exam.  She is perimenopause . Her last LMP was No LMP recorded (lmp unknown). Patient is perimenopausal..  Periods are absent , lasting 0 days.  Dysmenorrhea:none.  Patient reports problems with: none.  Partner Status: Marital Status: .    Desires STD Screening: YES/NO/Other: no.  Patient has no questions or concerns today.    Additional OB/GYN History   Current contraception: none    Last Pap : 2020  Last Completed Pap Smear          Ordered - PAP SMEAR (Every 3 Years) Ordered on 2020  Done - neg              History of abnormal Pap smear: yes   Family history of uterine, colon, breast, or ovarian cancer: breast cancer   Last mammogram: 2020   Last Completed Mammogram          MAMMOGRAM (Every 2 Years) Next due on 2020  Done - normal    2018  Mammo Diagnostic Digital Tomosynthesis Bilateral With CAD              Last colonoscopy: per patient  scheduled for    Last Completed Colonoscopy     This patient has no relevant Health Maintenance data.        Last DEXA: patient is due she states she has to call back to schedule this   Exercises Regularly: yes when she can   Feelings of Anxiety or Depression: none  Tobacco Usage?: no    Health Maintenance   Topic Date Due   • Annual Gynecologic Pelvic and Breast Exam  Never done   • COLORECTAL CANCER SCREENING  Never done   • ANNUAL PHYSICAL  Never done   • HEPATITIS C SCREENING  Never done   • ZOSTER VACCINE (2 of 2) 2021   • INFLUENZA VACCINE  10/01/2021   • MAMMOGRAM  2022   • PAP SMEAR  2023   • TDAP/TD VACCINES (8 - Td or Tdap) 2028   • COVID-19 Vaccine  Completed   • Pneumococcal Vaccine 0-64  Aged Out       Current Outpatient Medications   Medication Sig Dispense Refill   • atorvastatin (LIPITOR) 40 MG tablet Take 40 mg  "by mouth Daily.       No current facility-administered medications for this visit.       The additional following portions of the patient's history were reviewed and updated as appropriate: allergies, current medications, past family history, past medical history, past social history, past surgical history and problem list.    Review of Systems   Constitutional: Negative.    HENT: Negative.    Eyes: Negative.    Respiratory: Negative.    Cardiovascular: Negative.    Gastrointestinal: Negative.    Endocrine: Negative.    Genitourinary: Negative.    Musculoskeletal: Negative.    Allergic/Immunologic: Negative.    Neurological: Negative.    Hematological: Negative.    Psychiatric/Behavioral: Negative.        I have reviewed and agree with the HPI, ROS, and historical information as entered above. Cristo Casillas MD    Objective   /70   Ht 165.1 cm (65\")   Wt 62.1 kg (137 lb)   LMP  (LMP Unknown)   Breastfeeding No   BMI 22.80 kg/m²     PE    Breast: Bilateral mastectomies and implants noted  Axilla: Normal, no lymphadenopathy  Heart: Regular rate no murmurs rubs or gallops  Lungs: Clear to auscultation, normal breath sounds bilaterally  Abdomen: Soft nontender, no hepatosplenomegaly, no guarding or rebound, no masses  Pelvic exam  External genitalia: Normal introitus and vulva  Vagina: Normal mucosa no bleeding inflammation or discharge  Bladder: Normal position nontender  Urethral meatus and urethra: Normal nontender  Cervix: No lesions, no discharge, bleeding or inflammation  Bimanual: Nontender adnexa clear, no sign of uterine or ovarian enlargement      Assessment/Plan     Assessment     Problem List Items Addressed This Visit        Genitourinary and Reproductive     Encounter for annual routine gynecological examination - Primary    Relevant Orders    IGP, Rfx Aptima HPV ASCU       Hematology and Neoplasia    Personal history of breast cancer          1. GYN annual well woman exam.   2. Continue " yearly Paps    Plan     1. Reviewed HPV guidelines and she would like to continue yearly paps regardless.   2. Recommended use of Vitamin D and getting adequate calcium in her diet. (1500mg)  3. Anticipatory menopausal guidance given.  Preventative health initiatives reviewed  Cristo Casillas MD  08/18/2021

## 2021-08-24 DIAGNOSIS — Z01.419 ENCOUNTER FOR ANNUAL ROUTINE GYNECOLOGICAL EXAMINATION: ICD-10-CM

## 2022-08-23 ENCOUNTER — OFFICE VISIT (OUTPATIENT)
Dept: OBSTETRICS AND GYNECOLOGY | Facility: CLINIC | Age: 57
End: 2022-08-23

## 2022-08-23 VITALS
SYSTOLIC BLOOD PRESSURE: 120 MMHG | BODY MASS INDEX: 22.13 KG/M2 | WEIGHT: 132.8 LBS | DIASTOLIC BLOOD PRESSURE: 72 MMHG | HEIGHT: 65 IN

## 2022-08-23 DIAGNOSIS — Z12.39 ENCOUNTER FOR BREAST CANCER SCREENING USING NON-MAMMOGRAM MODALITY: ICD-10-CM

## 2022-08-23 DIAGNOSIS — Z01.419 WOMEN'S ANNUAL ROUTINE GYNECOLOGICAL EXAMINATION: Primary | ICD-10-CM

## 2022-08-23 DIAGNOSIS — Z78.0 POSTMENOPAUSAL STATUS: ICD-10-CM

## 2022-08-23 DIAGNOSIS — Z85.3 HISTORY OF BREAST CANCER: ICD-10-CM

## 2022-08-23 PROCEDURE — 99396 PREV VISIT EST AGE 40-64: CPT | Performed by: OBSTETRICS & GYNECOLOGY

## 2022-08-23 NOTE — PROGRESS NOTES
Gynecologic Annual Exam Note          GYN Annual Exam     Gynecologic Exam (Annual )        Subjective     HPI  Valery Porter is a 57 y.o. female, , who presents for annual well woman exam as a established patient . No LMP recorded. Patient is perimenopausal. Patient reports problems with: none.  Partner Status: Marital Status: . She is is not currently sexually active. STD testing recommendations have been explained to the patient and she does not desire STD testing. There were no changes to her medical or surgical history since her last visit..       Additional OB/GYN History   Current contraception: contraceptive methods: None  Desires to: continue contraception    Last Pap : 2021. Result: negative. HPV: unknown   Last Completed Pap Smear          PAP SMEAR (Every 3 Years) Next due on 2022  LIQUID-BASED PAP SMEAR, [facility] ([facility])    2021  SCANNED - PAP SMEAR    2020  Done - neg              History of abnormal Pap smear: yes - years ago   Family history of uterine, colon, breast, or ovarian cancer: yes - Sister and MGM-Breast   Performs monthly Self-Breast Exam: yes  Last mammogram: 2020. Done at .    Last Completed Mammogram     This patient has no relevant Health Maintenance data.          History of abnormal mammogram: yes -     Colonoscopy: has had a colonoscopy 1 year(s) ago.  Exercises Regularly: yes  Feelings of Anxiety or Depression: no  Tobacco Usage?: No       Current Outpatient Medications:   •  atorvastatin (LIPITOR) 40 MG tablet, Take 40 mg by mouth Daily., Disp: , Rfl:      Patient denies the need for medication refills today.    OB History        3    Para   3    Term   3            AB        Living   3       SAB        IAB        Ectopic        Molar        Multiple        Live Births   3                Past Medical History:   Diagnosis Date   • Abnormal Pap smear of cervix    • Breast cancer (HCC)    •  "History of recurrent UTIs         Past Surgical History:   Procedure Laterality Date   • AUGMENTATION MAMMAPLASTY     • BREAST BIOPSY     •  SECTION     • CRYOTHERAPY     • DIAGNOSTIC LAPAROSCOPY     • DILATATION AND CURETTAGE     • MASTECTOMY Bilateral    • OTHER SURGICAL HISTORY      ovaries removed    • TUBAL ABDOMINAL LIGATION         Health Maintenance   Topic Date Due   • COLORECTAL CANCER SCREENING  Never done   • ANNUAL PHYSICAL  Never done   • HEPATITIS C SCREENING  Never done   • COVID-19 Vaccine (4 - Booster for Moderna series) 2022   • MAMMOGRAM  2022   • Annual Gynecologic Pelvic and Breast Exam  2022   • INFLUENZA VACCINE  10/01/2022   • PAP SMEAR  2025   • TDAP/TD VACCINES (7 - Td or Tdap) 2028   • ZOSTER VACCINE  Completed   • Pneumococcal Vaccine 0-64  Aged Out       The additional following portions of the patient's history were reviewed and updated as appropriate: allergies, current medications, past family history, past medical history, past social history and past surgical history.    Review of Systems      I have reviewed and agree with the HPI, ROS, and historical information as entered above. Rosangela Malagon MD      Objective   /72 (BP Location: Right arm, Patient Position: Sitting, Cuff Size: Adult)   Ht 165.1 cm (65\")   Wt 60.2 kg (132 lb 12.8 oz)   Breastfeeding No   BMI 22.10 kg/m²     Physical Exam  Vitals and nursing note reviewed. Exam conducted with a chaperone present.   Constitutional:       Appearance: She is well-developed.   HENT:      Head: Normocephalic and atraumatic.   Neck:      Thyroid: No thyroid mass or thyromegaly.   Cardiovascular:      Rate and Rhythm: Normal rate and regular rhythm.      Heart sounds: No murmur heard.  Pulmonary:      Effort: Pulmonary effort is normal. No retractions.      Breath sounds: Normal breath sounds. No wheezing, rhonchi or rales.   Chest:      Chest wall: No mass or tenderness.   Breasts:      " Right: Normal. No mass, nipple discharge, skin change or tenderness.      Left: Normal. No mass, nipple discharge, skin change or tenderness.        Comments: Bilateral mastectomy with nipple sparing reconstruction  Abdominal:      General: Bowel sounds are normal.      Palpations: Abdomen is soft. Abdomen is not rigid. There is no mass.      Tenderness: There is no abdominal tenderness. There is no guarding.      Hernia: No hernia is present. There is no hernia in the left inguinal area.   Genitourinary:     Labia:         Right: No rash, tenderness or lesion.         Left: No rash, tenderness or lesion.       Vagina: Normal. No vaginal discharge or lesions.      Cervix: No cervical motion tenderness, discharge, lesion or cervical bleeding.      Uterus: Normal. Not enlarged, not fixed and not tender.       Adnexa:         Right: No mass or tenderness.          Left: No mass or tenderness.        Rectum: No external hemorrhoid.   Musculoskeletal:      Cervical back: Normal range of motion. No muscular tenderness.   Neurological:      Mental Status: She is alert and oriented to person, place, and time.   Psychiatric:         Behavior: Behavior normal.            Assessment and Plan    Problem List Items Addressed This Visit        Hematology and Neoplasia    History of breast cancer      Other Visit Diagnoses     Women's annual routine gynecological examination    -  Primary    Relevant Orders    LIQUID-BASED PAP SMEAR, [facility] ([facility]) (Completed)    Encounter for breast cancer screening using non-mammogram modality        Postmenopausal status              1. GYN annual well woman exam.   2. Pap guidelines reviewed.  3. Recommended use of Vitamin D replacement and getting adequate calcium in her diet. (1500mg)  4. Reviewed [facility] ovarian cancer screening program.      Rosangela Malagon MD  08/23/2022

## 2022-08-25 PROBLEM — Z85.3 HISTORY OF BREAST CANCER: Status: ACTIVE | Noted: 2022-08-25

## 2022-08-25 LAB — REF LAB TEST METHOD: NORMAL

## 2023-08-29 ENCOUNTER — OFFICE VISIT (OUTPATIENT)
Dept: OBSTETRICS AND GYNECOLOGY | Facility: CLINIC | Age: 58
End: 2023-08-29
Payer: COMMERCIAL

## 2023-08-29 VITALS
SYSTOLIC BLOOD PRESSURE: 118 MMHG | DIASTOLIC BLOOD PRESSURE: 62 MMHG | BODY MASS INDEX: 23.66 KG/M2 | HEIGHT: 65 IN | WEIGHT: 142 LBS

## 2023-08-29 DIAGNOSIS — Z85.820 HISTORY OF MELANOMA: ICD-10-CM

## 2023-08-29 DIAGNOSIS — Z90.722 STATUS POST BILATERAL OOPHORECTOMY: ICD-10-CM

## 2023-08-29 DIAGNOSIS — Z01.419 WOMEN'S ANNUAL ROUTINE GYNECOLOGICAL EXAMINATION: Primary | ICD-10-CM

## 2023-08-29 DIAGNOSIS — Z85.3 PERSONAL HISTORY OF BREAST CANCER: ICD-10-CM

## 2023-08-29 RX ORDER — LIFITEGRAST 50 MG/ML
SOLUTION/ DROPS OPHTHALMIC
COMMUNITY
Start: 2022-05-13

## 2023-08-29 RX ORDER — MULTIPLE VITAMINS W/ MINERALS TAB 9MG-400MCG
1 TAB ORAL DAILY
COMMUNITY

## 2023-08-29 NOTE — PROGRESS NOTES
Gynecologic Annual Exam Note        GYN Annual Exam     CC - Here for annual exam.        HPI  Valery Porter is a 58 y.o. female, , who presents for annual well woman exam as a established patient. She is postmenopausal. She has had a bilateral oophorectomy. Denies vaginal bleeding. Patient reports problems with:  none . Since her last visit the patient underwent surgery for mole removal that was +Melanoma . Partner Status: Marital Status: . She is is not currently sexually active. STD testing recommendations have been explained to the patient and she does not desire STD testing.    Additional OB/GYN History   On HRT? No    Last Pap : 2022. Results: negative. HPV: negative.   Last Completed Pap Smear            PAP SMEAR (Every 3 Years) Next due on 2022  LIQUID-BASED PAP SMEAR, [facility] ([facility])    2021  SCANNED - PAP SMEAR    2020  Done - neg                  History of abnormal Pap smear: yes - h/o cryotherapy in college- repeats normal  Family history of uterine, colon, breast, or ovarian cancer: yes - Sister and MGM- breast CA  Performs monthly Self-Breast Exam: yes  Last mammogram: Patient is seeing [facility] and Dr. Renteria.   Last Completed Mammogram       This patient has no relevant Health Maintenance data.          Last colonoscopy: has had a colonoscopy 2 year(s) ago.  Last Completed Colonoscopy       This patient has no relevant Health Maintenance data.              Last bone density scan (DEXA): In 2019 and results were Normal  Exercises Regularly: yes  Feelings of Anxiety or Depression: no      Tobacco Usage?: No       Current Outpatient Medications:     atorvastatin (LIPITOR) 40 MG tablet, Take 1 tablet by mouth Daily., Disp: , Rfl:     Calcium Carbonate (CALCIUM 500 PO), Take  by mouth., Disp: , Rfl:     multivitamin with minerals tablet tablet, Take 1 tablet by mouth Daily., Disp: , Rfl:     vitamin D3 125 MCG (5000 UT) capsule  capsule, Take 1 capsule by mouth Daily., Disp: , Rfl:     Lifitegrast (Xiidra) 5 % ophthalmic solution, INSTILL 1 DROP INTO BOTH EYES TWICE A DAY (Patient not taking: Reported on 2023), Disp: , Rfl:     Patient denies the need for medication refills today.    OB History          3    Para   3    Term   3            AB        Living   3         SAB        IAB        Ectopic        Molar        Multiple        Live Births   3                Past Medical History:   Diagnosis Date    Abnormal Pap smear of cervix     Breast cancer     Endometriosis     Female infertility     History of recurrent UTIs     Melanoma     Ovarian cyst     Urinary tract infection     Varicella         Past Surgical History:   Procedure Laterality Date    AUGMENTATION MAMMAPLASTY      BREAST BIOPSY       SECTION       SECTION WITH TUBAL  [id number]    [facility]OTHERAPY      in [facility]    D & C HYSTEROSCOPY  2019    PMB, Thickened Endometrium, h/o breast CA on Tamoxifen    DIAGNOSTIC LAPAROSCOPY      MASTECTOMY Bilateral     MOLE REMOVAL  2023    Melanoma    OOPHORECTOMY Bilateral 2020    ovarian cyst- with Dr. Cesar at     TUBAL ABDOMINAL LIGATION      WISDOM TOOTH EXTRACTION         Health Maintenance   Topic Date Due    DXA SCAN  Never done    COLORECTAL CANCER SCREENING  Never done    HEPATITIS C SCREENING  Never done    ANNUAL PHYSICAL  Never done    COVID-19 Vaccine (4 - Moderna series) 2022    Annual Gynecologic Pelvic and Breast Exam  2023    INFLUENZA VACCINE  10/01/2023    PAP SMEAR  2025    TDAP/TD VACCINES (7 - Td or Tdap) 2028    Orthopox/Monkeypox  Completed    ZOSTER VACCINE  Completed    Pneumococcal Vaccine 0-64  Aged Out       The additional following portions of the patient's history were reviewed and updated as appropriate: allergies, current medications, past family history, past medical history, past social history, past surgical history, and problem  "list.    Review of Systems   Constitutional: Negative.    HENT: Negative.     Eyes: Negative.    Respiratory: Negative.     Cardiovascular: Negative.    Gastrointestinal: Negative.    Endocrine: Negative.    Genitourinary: Negative.    Musculoskeletal: Negative.    Skin: Negative.    Allergic/Immunologic: Negative.    Neurological: Negative.    Hematological: Negative.    Psychiatric/Behavioral: Negative.       I have reviewed and agree with the HPI, ROS, and historical information as entered above. Rosangela Malagon MD      Objective   /62   Ht 165.1 cm (65\")   Wt 64.4 kg (142 lb)   BMI 23.63 kg/mý     Physical Exam  Vitals and nursing note reviewed. Exam conducted with a chaperone present.   Constitutional:       Appearance: She is well-developed.   HENT:      Head: Normocephalic and atraumatic.   Neck:      Thyroid: No thyroid mass or thyromegaly.   Cardiovascular:      Rate and Rhythm: Normal rate and regular rhythm.      Heart sounds: No murmur heard.  Pulmonary:      Effort: Pulmonary effort is normal. No retractions.      Breath sounds: Normal breath sounds. No wheezing, rhonchi or rales.   Chest:      Chest wall: No mass or tenderness.   Breasts:     Right: Normal. No mass, nipple discharge, skin change or tenderness.      Left: Normal. No mass, nipple discharge, skin change or tenderness.      Comments: S/p skin sparing mastectomy  Abdominal:      General: Bowel sounds are normal.      Palpations: Abdomen is soft. Abdomen is not rigid. There is no mass.      Tenderness: There is no abdominal tenderness. There is no guarding.      Hernia: No hernia is present. There is no hernia in the left inguinal area.   Genitourinary:     Labia:         Right: No rash, tenderness or lesion.         Left: No rash, tenderness or lesion.       Vagina: Normal. No vaginal discharge or lesions.      Cervix: No cervical motion tenderness, discharge, lesion or cervical bleeding.      Uterus: Normal. Not enlarged, not " fixed and not tender.       Adnexa:         Right: No mass or tenderness.          Left: No mass or tenderness.        Rectum: No external hemorrhoid.   Musculoskeletal:      Cervical back: Normal range of motion. No muscular tenderness.   Neurological:      Mental Status: She is alert and oriented to person, place, and time.   Psychiatric:         Behavior: Behavior normal.          Assessment and Plan    Problem List Items Addressed This Visit          Genitourinary and Reproductive     Status post bilateral oophorectomy       Hematology and Neoplasia    Personal history of breast cancer    History of melanoma     Other Visit Diagnoses       Women's annual routine gynecological examination    -  Primary            GYN annual well woman exam.   Recommended use of Vitamin D replacement and getting adequate calcium in her diet. (1500mg)  Reviewed HPV guidelines.  Reviewed exercise as a preventative health measures.    Return in about 1 year (around 8/29/2024), or if symptoms worsen or fail to improve.     Rosangela Malagon MD  08/29/2023

## 2024-09-04 ENCOUNTER — OFFICE VISIT (OUTPATIENT)
Dept: OBSTETRICS AND GYNECOLOGY | Facility: CLINIC | Age: 59
End: 2024-09-04
Payer: COMMERCIAL

## 2024-09-04 VITALS
HEIGHT: 65 IN | BODY MASS INDEX: 23.09 KG/M2 | WEIGHT: 138.6 LBS | SYSTOLIC BLOOD PRESSURE: 120 MMHG | DIASTOLIC BLOOD PRESSURE: 78 MMHG

## 2024-09-04 DIAGNOSIS — Z12.39 ENCOUNTER FOR BREAST CANCER SCREENING USING NON-MAMMOGRAM MODALITY: ICD-10-CM

## 2024-09-04 DIAGNOSIS — Z78.0 POSTMENOPAUSAL: ICD-10-CM

## 2024-09-04 DIAGNOSIS — Z85.820 HISTORY OF MELANOMA: ICD-10-CM

## 2024-09-04 DIAGNOSIS — Z90.722 STATUS POST BILATERAL OOPHORECTOMY: ICD-10-CM

## 2024-09-04 DIAGNOSIS — Z01.419 WOMEN'S ANNUAL ROUTINE GYNECOLOGICAL EXAMINATION: Primary | ICD-10-CM

## 2024-09-04 DIAGNOSIS — Z85.3 HISTORY OF BREAST CANCER: ICD-10-CM

## 2024-09-04 NOTE — PROGRESS NOTES
Gynecologic Annual Exam Note        GYN Annual Exam     CC - Here for annual exam.        HPI  Valery Porter is a 59 y.o. female, , who presents for annual well woman exam as a established patient.  She is s/p bilateral salpingectomy for an ovarian cyst. Denies vaginal bleeding.   There were no changes to her medical or surgical history since her last visit. Marital Status: .  She is not currently sexually active. STD testing recommendations have been explained to the patient and she declines STD testing.    The patient would like to discuss the following complaints today: none    Additional OB/GYN History   On HRT? No    Last Pap : 2022. Results: negative. HPV: negative.   Last Completed Pap Smear            PAP SMEAR (Every 3 Years) Next due on 2022  LIQUID-BASED PAP SMEAR, [facility] ([facility])    2021  SCANNED - PAP SMEAR    2020  Done - neg                  History of abnormal Pap smear: yes - h/o cryotherapy in college- repeats normal  Family history of uterine, colon, breast, or ovarian cancer: yes - Patient had breast cancer, Sister and MGM- breast CA  Performs monthly Self-Breast Exam: occasionally  Last mammogram: 2018. Done at . There is a copy in the chart.    Last Completed Mammogram       This patient has no relevant Health Maintenance data.          Last colonoscopy: has had a colonoscopy 3 years ago    Last Completed Colonoscopy       This patient has no relevant Health Maintenance data.            Her last bone density scan was 5 years ago and results were Normal  Exercises Regularly: yes  Feelings of Anxiety or Depression: no      Tobacco Usage?: No       Current Outpatient Medications:     atorvastatin (LIPITOR) 40 MG tablet, Take 1 tablet by mouth Daily., Disp: , Rfl:     Lifitegrast (Xiidra) 5 % ophthalmic solution, , Disp: , Rfl:     loratadine (CLARITIN REDITABS) 5 MG tablet dispersible disintegrating tablet, Take 1 tablet by  mouth., Disp: , Rfl:     multivitamin with minerals tablet tablet, Take 1 tablet by mouth Daily., Disp: , Rfl:     Patient denies the need for medication refills today.    OB History          3    Para   3    Term   3            AB        Living   3         SAB        IAB        Ectopic        Molar        Multiple        Live Births   3                Past Medical History:   Diagnosis Date    Abnormal Pap smear of cervix     Breast cancer     Endometriosis     Female infertility     History of recurrent UTIs     Melanoma     Ovarian cyst     Urinary tract infection     Varicella         Past Surgical History:   Procedure Laterality Date    AUGMENTATION MAMMAPLASTY      BREAST BIOPSY       SECTION       SECTION WITH TUBAL  [id number]    CRYOTHERAPY      in [facility]    D & C HYSTEROSCOPY  2019    PMB, Thickened Endometrium, h/o breast CA on Tamoxifen    DIAGNOSTIC LAPAROSCOPY      MASTECTOMY Bilateral     MOLE REMOVAL  2023    Melanoma    OOPHORECTOMY Bilateral 2020    ovarian cyst- with Dr. Cesar at     TUBAL ABDOMINAL LIGATION      WISDOM TOOTH EXTRACTION         Health Maintenance   Topic Date Due    DXA SCAN  Never done    COLORECTAL CANCER SCREENING  Never done    HEPATITIS C SCREENING  Never done    ANNUAL PHYSICAL  Never done    Annual Gynecologic Pelvic and Breast Exam  2024    COVID-19 Vaccine ( - -24 season) 2024    INFLUENZA VACCINE  2024    PAP SMEAR  2025    TDAP/TD VACCINES (7 - Td or Tdap) 2028    Orthopox/Monkeypox  Completed    ZOSTER VACCINE  Completed    Pneumococcal Vaccine 0-64  Aged Out       The additional following portions of the patient's history were reviewed and updated as appropriate: allergies, current medications, past family history, past medical history, past social history, past surgical history, and problem list.    Review of Systems   Constitutional: Negative.    HENT: Negative.     Eyes: Negative.   "  Respiratory: Negative.     Cardiovascular: Negative.    Gastrointestinal: Negative.    Endocrine: Negative.    Genitourinary: Negative.    Musculoskeletal: Negative.    Skin: Negative.    Allergic/Immunologic: Negative.    Neurological: Negative.    Hematological: Negative.    Psychiatric/Behavioral: Negative.         I have reviewed and agree with the HPI, ROS, and historical information as entered above. Rosangela Malagon MD      Objective   /78   Ht 165.1 cm (65\")   Wt 62.9 kg (138 lb 9.6 oz)   LMP  (LMP Unknown)   BMI 23.06 kg/m²     Physical Exam  Vitals and nursing note reviewed. Exam conducted with a chaperone present.   Constitutional:       Appearance: She is well-developed.   HENT:      Head: Normocephalic and atraumatic.   Neck:      Thyroid: No thyroid mass or thyromegaly.   Cardiovascular:      Rate and Rhythm: Normal rate and regular rhythm.      Heart sounds: No murmur heard.  Pulmonary:      Effort: Pulmonary effort is normal. No retractions.      Breath sounds: Normal breath sounds. No wheezing, rhonchi or rales.   Chest:      Chest wall: No mass or tenderness.   Breasts:     Right: Normal. No mass, nipple discharge, skin change or tenderness.      Left: Normal. No mass, nipple discharge, skin change or tenderness.      Comments: S/p bilateral mastectomy with reconstruction  Abdominal:      General: Bowel sounds are normal.      Palpations: Abdomen is soft. Abdomen is not rigid. There is no mass.      Tenderness: There is no abdominal tenderness. There is no guarding.      Hernia: No hernia is present. There is no hernia in the left inguinal area.   Genitourinary:     Labia:         Right: No rash, tenderness or lesion.         Left: No rash, tenderness or lesion.       Vagina: Normal. No vaginal discharge or lesions.      Cervix: No cervical motion tenderness, discharge, lesion or cervical bleeding.      Uterus: Normal. Not enlarged, not fixed and not tender.       Adnexa:         Right: " No mass or tenderness.          Left: No mass or tenderness.        Rectum: No external hemorrhoid.   Musculoskeletal:      Cervical back: Normal range of motion. No muscular tenderness.   Neurological:      Mental Status: She is alert and oriented to person, place, and time.   Psychiatric:         Behavior: Behavior normal.            Assessment and Plan    Problem List Items Addressed This Visit          Genitourinary and Reproductive     Status post bilateral oophorectomy       Hematology and Neoplasia    History of breast cancer    History of melanoma     Other Visit Diagnoses       Women's annual routine gynecological examination    -  Primary    Relevant Orders    DEXA Bone Density Axial    Encounter for breast cancer screening using non-mammogram modality        Postmenopausal        Relevant Orders    DEXA Bone Density Axial            GYN annual well woman exam.   Recommended use of Vitamin D replacement and getting adequate calcium in her diet. (1500mg)  Reviewed monthly self breast exams.  Instructed to call with lumps, pain, or breast discharge.    Continue yearly mammography  Reviewed HPV guidelines.  Reviewed exercise as a preventative health measures.   Reviewed risks/benefits of ERT including the lack of evidence estrogen alone increases the risk of breast cancer or stroke and decreases risk of hip fracture, colon cancer and all cause mortality.  However there is concern slight increase risk of pulmonary embolism.   Patient strongly desires to stay on or start HRT.  She understands she will use the lowest dose that adequately controls her symptoms.   Return in about 1 year (around 9/4/2025), or with BDS.         Rosangela Malagon MD  09/04/2024